# Patient Record
Sex: MALE | Race: OTHER | URBAN - METROPOLITAN AREA
[De-identification: names, ages, dates, MRNs, and addresses within clinical notes are randomized per-mention and may not be internally consistent; named-entity substitution may affect disease eponyms.]

---

## 2020-05-18 ENCOUNTER — EMERGENCY (EMERGENCY)
Facility: HOSPITAL | Age: 20
LOS: 0 days | Discharge: HOME | End: 2020-05-18
Attending: EMERGENCY MEDICINE | Admitting: EMERGENCY MEDICINE
Payer: COMMERCIAL

## 2020-05-18 VITALS
DIASTOLIC BLOOD PRESSURE: 88 MMHG | HEART RATE: 58 BPM | SYSTOLIC BLOOD PRESSURE: 125 MMHG | RESPIRATION RATE: 16 BRPM | TEMPERATURE: 97 F | OXYGEN SATURATION: 100 %

## 2020-05-18 PROCEDURE — 73030 X-RAY EXAM OF SHOULDER: CPT | Mod: 26,RT

## 2020-05-18 PROCEDURE — 99284 EMERGENCY DEPT VISIT MOD MDM: CPT | Mod: 57

## 2020-05-18 PROCEDURE — 23650 CLTX SHO DSLC W/MNPJ WO ANES: CPT | Mod: 54

## 2020-05-18 NOTE — ED PROVIDER NOTE - NSFOLLOWUPINSTRUCTIONS_ED_ALL_ED_FT
Shoulder Dislocation  Your shoulder joint is made up of 3 bones:  The upper arm bone (humerus).The shoulder blade (scapula).The collarbone (clavicle).A shoulder dislocation happens when your upper arm bone moves out of its normal place in your shoulder joint.  What are the causes?  This condition is often caused by:  A fall.A hard, direct hit to the shoulder.A forceful movement of the shoulder.What increases the risk?  You are more likely to develop this condition if you play sports.  What are the signs or symptoms?     Bad shape (deformity) of the shoulder.Very bad pain.A shoulder that you cannot move.Numbness, weakness, or tingling in your neck or down your arm.Bruising or swelling around your shoulder.How is this treated?  This condition is treated with a procedure called a reduction. This is done to place the upper arm bone back in the joint. There are two types of reduction:  Closed reduction. The upper arm bone is placed back in the joint without surgery. The doctor uses his or her hands to guide the bone back into place.Open reduction. Surgery is done to place the upper arm bone back in the joint. This may be needed if:  You have a weak shoulder joint or weak tissues that connect bones to each other (ligaments).You have had more than one shoulder dislocation.The nerves or blood vessels around your shoulder have been damaged.After the procedure, you will wear a brace or sling to prevent the arm from moving.  After the brace or sling is removed, you will have physical therapy to help improve movement (range of motion) in your shoulder joint.  Follow these instructions at home:  Medicines     Take over-the-counter and prescription medicines only as told by your doctor.Ask your doctor if the medicine prescribed to you:   Requires you to avoid driving or using heavy machinery. Can cause trouble pooping (constipation). You may need to take steps to prevent or treat trouble pooping:  Drink enough fluid to keep your pee (urine) pale yellow.Take over-the-counter or prescription medicines.Eat foods that are high in fiber. These include beans, whole grains, and fresh fruits and vegetables. Limit foods that are high in fat and sugar. These include fried or sweet foods.If you have a brace or sling:     Wear the brace or sling as told by your doctor. Remove it only as told by your doctor.Loosen the brace or sling if your fingers:  Tingle.Become numb.Turn cold or blue.Keep the brace or sling clean.If the brace or sling is not waterproof:  Do not let it get wet.Cover it with a watertight covering when you take a bath or shower.Managing pain, stiffness, and swelling        If told, put ice on the injured area.  If you can remove your brace or sling, remove it as told by your doctor.Put ice in a plastic bag.Place a towel between your skin and the bag.Leave the ice on for 20 minutes, 2–3 times per day.Move your fingers often.Raise (elevate) the injured area above the level of your heart while you are sitting or lying down.Activity     Do not lift your arm above shoulder level until your doctor approves.Do not lift anything until your doctor says that it is safe.Do not push or pull things until your doctor approves.Return to your normal activities as told by your doctor. Ask your doctor what activities are safe for you.Do range-of-motion exercises only as told by your doctor.Exercise your hand by squeezing a soft ball. This keeps your hand and wrist from getting stiff and swollen.General instructions     Do not drive while you are wearing a brace or sling on a hand that you use for driving.Do not take baths, swim, or use a hot tub until your doctor approves. Ask your doctor if you may take showers. You may only be allowed to take sponge baths.Do not use any tobacco products, including cigarettes, chewing tobacco, or e-cigarettes. These can delay healing. If you need help quitting, ask your doctor.Keep all follow-up visits as told by your doctor. This is important.Contact a doctor if:  Your brace or sling gets damaged.Get help right away if:  Your pain gets worse, not better.You lose feeling in your arm or hand.Your arm or hand turns white and cold.Summary  A shoulder dislocation happens when your upper arm bone moves out of its normal place in your shoulder joint.It is often caused by a fall, a strong hit to the shoulder, or a forceful movement of the shoulder.It causes very bad pain. You may not be able to move your shoulder.This condition is treated with either closed or open reduction. You will also be given a brace or sling. You will do exercises to improve movement in your shoulder joint.Contact a doctor if your brace or sling gets damaged. Get help right away if your pain gets worse, you lose feeling in your arm or hand, or your arm or hand turns white or cold.This information is not intended to replace advice given to you by your health care provider. Make sure you discuss any questions you have with your health care provider.

## 2020-05-18 NOTE — ED PROVIDER NOTE - PHYSICAL EXAMINATION
CONSTITUTIONAL: Well-developed; well-nourished; in no acute distress.   SKIN: warm, dry  HEAD: Normocephalic; atraumatic.  EYES: PERRL, EOMI, normal sclera and conjunctiva   ENT: No nasal discharge; airway clear.  NECK: Supple; non tender.  CARD: S1, S2 normal; no murmurs, gallops, or rubs. Regular rate and rhythm.   RESP: No wheezes, rales or rhonchi.  ABD: soft ntnd  EXT: R shoulder ROM limited, post-reduction able to touch opposite shoulder with R hand. normal sensation upper extremities.   LYMPH: No acute cervical adenopathy.  NEURO: Alert, oriented, grossly unremarkable  PSYCH: Cooperative, appropriate. CONSTITUTIONAL: Well-developed; well-nourished; in no acute distress.   SKIN: warm, dry  HEAD: Normocephalic; atraumatic.  EYES: PERRL, EOMI, normal sclera and conjunctiva   ENT: No nasal discharge; airway clear.  NECK: Supple; non tender. no midline tenderness.  CARD: S1, S2 normal; no murmurs, gallops, or rubs. Regular rate and rhythm.   RESP: No wheezes, rales or rhonchi.  ABD: soft ntnd  EXT: R shoulder non-tender to palpation, ROM limited, post-reduction able to touch opposite shoulder with R hand.  normal sensation and ROM in rest of b/l upper extremity  LYMPH: No acute cervical adenopathy.  NEURO: Alert, oriented, grossly unremarkable  PSYCH: Cooperative, appropriate. CONSTITUTIONAL: Well-developed; well-nourished; in no acute distress.   SKIN: warm, dry  HEAD: Normocephalic; atraumatic.  EYES: PERRL, EOMI, normal sclera and conjunctiva   ENT: No nasal discharge; airway clear.  NECK: Supple; non tender. no midline tenderness.  CARD: S1, S2 normal; no murmurs, gallops, or rubs. Regular rate and rhythm.   RESP: No wheezes, rales or rhonchi.  ABD: soft ntnd  EXT: R shoulder non-tender to palpation, ROM limited due to pain, post-reduction able to touch opposite shoulder with R hand. R arm in sling.  normal sensation b/l upper extremity  LYMPH: No acute cervical adenopathy.  NEURO: Alert, oriented, grossly unremarkable  PSYCH: Cooperative, appropriate.

## 2020-05-18 NOTE — ED PROVIDER NOTE - ATTENDING CONTRIBUTION TO CARE
19 year old male, comes in with complaint of right shoulde rpain, sp fall from cough, + clinically dislocated, no head injury, no loc, no n/v/d    CONSTITUTIONAL: Well-developed; well-nourished; in no acute distress. Sitting up and providing appropriate history and physical examination  TRAUMA: Primary and Secondary surveys intact, GCS 15, no midline CTLS spine tenderness, Pelvis stable, + right deltoid flattening, + unable to range right shoulder, FAST Negative  SKIN: skin exam is warm and dry, no acute rash.  HEAD: Normocephalic; atraumatic.  EYES: PERRL, 3 mm bilateral, no nystagmus, EOM intact; conjunctiva and sclera clear.  ENT: No nasal discharge; airway clear.  NECK: Supple; non tender. + full passive ROM in all directions. No JVD  EXT: otherwise: Normal ROM. No clubbing, cyanosis or edema. Dp and Pt Pulses intact. Cap refill less than 3 seconds  NEURO: CN 2-12 intact, normal finger to nose, normal romberg, stable gait, no sensory or motor deficits, Alert, oriented, grossly unremarkable. No Focal deficits. GCS 15. NIH 0  PSYCH: Cooperative, appropriate.

## 2020-05-18 NOTE — ED PROVIDER NOTE - NS ED ROS FT
Constitutional:  see HPI  Head:  no headache, dizziness, loss of consciousness  Eyes:  no visual changes; no eye pain, redness, or discharge  ENMT:  no ear pain or discharge; no hearing problems; no mouth or throat sores or lesions; no throat pain  Cardiac: no chest pain, tachycardia or palpitations  Respiratory: no cough, wheezing, shortness of breath, chest tightness, or trouble breathing  GI: no nausea, vomiting, diarrhea or abdominal pain  :  no dysuria, frequency, or burning with urination; no change in urine output  MS: R shoulder pain  Neuro: no seizure  Skin:  no rashes or color changes; no lacerations or abrasions

## 2020-05-18 NOTE — ED PROVIDER NOTE - OBJECTIVE STATEMENT
18 yo male with no pmhx presenting with sudden onset, sharp R shoulder pain after falling from couch onto floor 1.5 hours before arrival. denies LOC, head injury, n/v/d, cp, abd pain, sob, numbness, tingling.

## 2020-05-18 NOTE — ED PROVIDER NOTE - CLINICAL SUMMARY MEDICAL DECISION MAKING FREE TEXT BOX
I personally evaluated the patient. I reviewed the Resident’s or Physician Assistant’s note (as assigned above), and agree with the findings and plan except as documented in my note. Patient reduced emergently for pain control, patient feels much better post reduction, patient had FROM of the right shoulder post reduction, strong radial pulse prior to and after the procedure. I have fully discussed the medical management and delivery of care with the patient. I have discussed any available labs, imaging and treatment options with the patient. Patient confirms understanding and has been given detailed return precautions. Patient instructed to return to the ED should symptoms persist or worsen. Patient has demonstrated capacity and has verbalized understanding. Patient is well appearing upon discharge.

## 2020-05-18 NOTE — ED PROVIDER NOTE - CARE PROVIDER_API CALL
Osmar Benton  Orthopaedic Surgery  [address] [place] [address]  Phone: [phone]  Fax: [phone]  Follow Up Time: 1-3 Days

## 2020-05-18 NOTE — ED PROVIDER NOTE - PATIENT PORTAL LINK FT
You can access the FollowMyHealth Patient Portal offered by [facility] by registering at the following website: [email or web address]. By joining [employer]’s FollowMyHealth portal, you will also be able to view your health information using other applications (apps) compatible with our system.
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
## 2020-05-22 DIAGNOSIS — S43.004A UNSPECIFIED DISLOCATION OF RIGHT SHOULDER JOINT, INITIAL ENCOUNTER: ICD-10-CM
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
## 2020-05-22 DIAGNOSIS — M25.511 PAIN IN RIGHT SHOULDER: ICD-10-CM
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
